# Patient Record
Sex: FEMALE | Race: OTHER | Employment: OTHER | URBAN - METROPOLITAN AREA
[De-identification: names, ages, dates, MRNs, and addresses within clinical notes are randomized per-mention and may not be internally consistent; named-entity substitution may affect disease eponyms.]

---

## 2022-09-22 NOTE — PATIENT DISCUSSION
Ed. patient. Updated Glasses Rx given.  Patient has been wearing incorrect Rx. Discussed with patient cutting the plus to get used to Rx and increased hyperopic rx in steps for patient to adjust to correct Rx. Monitor.

## 2022-12-19 ENCOUNTER — NEW PATIENT (OUTPATIENT)
Dept: URBAN - METROPOLITAN AREA CLINIC 44 | Facility: CLINIC | Age: 82
End: 2022-12-19

## 2022-12-19 PROCEDURE — 64612 DESTROY NERVE FACE MUSCLE: CPT

## 2022-12-19 ASSESSMENT — VISUAL ACUITY
OS_CC: 20/25-2
OD_CC: 20/15

## 2023-01-11 ENCOUNTER — NEW PATIENT (OUTPATIENT)
Dept: URBAN - METROPOLITAN AREA CLINIC 38 | Facility: CLINIC | Age: 83
End: 2023-01-11

## 2023-01-11 DIAGNOSIS — T15.01XA: ICD-10-CM

## 2023-01-11 DIAGNOSIS — Z98.890: ICD-10-CM

## 2023-01-11 DIAGNOSIS — H02.204: ICD-10-CM

## 2023-01-11 DIAGNOSIS — G24.5: ICD-10-CM

## 2023-01-11 DIAGNOSIS — H02.88B: ICD-10-CM

## 2023-01-11 DIAGNOSIS — H16.223: ICD-10-CM

## 2023-01-11 DIAGNOSIS — H35.373: ICD-10-CM

## 2023-01-11 DIAGNOSIS — H01.02B: ICD-10-CM

## 2023-01-11 DIAGNOSIS — H01.02A: ICD-10-CM

## 2023-01-11 DIAGNOSIS — Z96.1: ICD-10-CM

## 2023-01-11 DIAGNOSIS — G51.0: ICD-10-CM

## 2023-01-11 DIAGNOSIS — H02.88A: ICD-10-CM

## 2023-01-11 PROCEDURE — 92004 COMPRE OPH EXAM NEW PT 1/>: CPT

## 2023-01-11 PROCEDURE — 92134 CPTRZ OPH DX IMG PST SGM RTA: CPT

## 2023-01-11 PROCEDURE — 92015 DETERMINE REFRACTIVE STATE: CPT

## 2023-01-11 RX ORDER — LIFITEGRAST 50 MG/ML: 1 SOLUTION/ DROPS OPHTHALMIC TWICE A DAY

## 2023-01-11 RX ORDER — TOBRAMYCIN 3 MG/ML: 1 SOLUTION/ DROPS OPHTHALMIC

## 2023-01-11 ASSESSMENT — VISUAL ACUITY
OD_CC: J1+
OU_SC: 20/20-1
OU_CC: J1+
OU_CC: 20/20-1
OS_CC: 20/40+2
OS_SC: J5
OD_SC: 20/20-1
OD_SC: J16
OU_SC: J7
OD_CC: 20/20-1
OS_SC: 20/40+2

## 2023-01-11 ASSESSMENT — TONOMETRY
OS_IOP_MMHG: 15
OD_IOP_MMHG: 13

## 2023-01-16 ENCOUNTER — ESTABLISHED PATIENT (OUTPATIENT)
Dept: URBAN - METROPOLITAN AREA CLINIC 39 | Facility: CLINIC | Age: 83
End: 2023-01-16

## 2023-01-16 DIAGNOSIS — G24.5: ICD-10-CM

## 2023-01-16 PROCEDURE — 64612 DESTROY NERVE FACE MUSCLE: CPT

## 2023-01-16 ASSESSMENT — VISUAL ACUITY
OS_SC: 20/40
OD_SC: 20/20

## 2023-11-15 ENCOUNTER — FOLLOW UP (OUTPATIENT)
Dept: URBAN - METROPOLITAN AREA CLINIC 39 | Facility: CLINIC | Age: 83
End: 2023-11-15

## 2023-11-15 DIAGNOSIS — H02.204: ICD-10-CM

## 2023-11-15 DIAGNOSIS — H01.02A: ICD-10-CM

## 2023-11-15 DIAGNOSIS — H02.88A: ICD-10-CM

## 2023-11-15 DIAGNOSIS — H16.223: ICD-10-CM

## 2023-11-15 DIAGNOSIS — H02.88B: ICD-10-CM

## 2023-11-15 DIAGNOSIS — H01.02B: ICD-10-CM

## 2023-11-15 PROCEDURE — 92012 INTRM OPH EXAM EST PATIENT: CPT

## 2023-11-15 RX ORDER — CYCLOSPORINE 0 MG/ML: 1 SOLUTION/ DROPS OPHTHALMIC; TOPICAL TWICE A DAY

## 2023-11-15 ASSESSMENT — TONOMETRY
OS_IOP_MMHG: 13
OD_IOP_MMHG: 13

## 2023-11-15 ASSESSMENT — VISUAL ACUITY
OU_CC: 20/30-2
OD_CC: 20/30+2
OS_CC: 20/40-2